# Patient Record
Sex: FEMALE | Race: OTHER | HISPANIC OR LATINO | URBAN - METROPOLITAN AREA
[De-identification: names, ages, dates, MRNs, and addresses within clinical notes are randomized per-mention and may not be internally consistent; named-entity substitution may affect disease eponyms.]

---

## 2018-12-21 ENCOUNTER — EMERGENCY (EMERGENCY)
Facility: HOSPITAL | Age: 18
LOS: 1 days | Discharge: HOME | End: 2018-12-21
Attending: STUDENT IN AN ORGANIZED HEALTH CARE EDUCATION/TRAINING PROGRAM
Payer: COMMERCIAL

## 2018-12-21 VITALS
SYSTOLIC BLOOD PRESSURE: 97 MMHG | WEIGHT: 145.06 LBS | HEIGHT: 67 IN | OXYGEN SATURATION: 98 % | RESPIRATION RATE: 18 BRPM | DIASTOLIC BLOOD PRESSURE: 61 MMHG | TEMPERATURE: 99 F | HEART RATE: 70 BPM

## 2018-12-21 PROCEDURE — 73130 X-RAY EXAM OF HAND: CPT

## 2018-12-21 PROCEDURE — 73110 X-RAY EXAM OF WRIST: CPT

## 2018-12-21 PROCEDURE — 99053 MED SERV 10PM-8AM 24 HR FAC: CPT

## 2018-12-21 PROCEDURE — 73130 X-RAY EXAM OF HAND: CPT | Mod: 26,RT

## 2018-12-21 PROCEDURE — 99283 EMERGENCY DEPT VISIT LOW MDM: CPT | Mod: 25

## 2018-12-21 PROCEDURE — 99284 EMERGENCY DEPT VISIT MOD MDM: CPT | Mod: 25

## 2018-12-21 PROCEDURE — 73110 X-RAY EXAM OF WRIST: CPT | Mod: 26,RT

## 2018-12-21 PROCEDURE — 29125 APPL SHORT ARM SPLINT STATIC: CPT | Mod: RT

## 2018-12-21 PROCEDURE — 29125 APPL SHORT ARM SPLINT STATIC: CPT

## 2018-12-21 RX ORDER — IBUPROFEN 200 MG
600 TABLET ORAL ONCE
Qty: 0 | Refills: 0 | Status: COMPLETED | OUTPATIENT
Start: 2018-12-21 | End: 2018-12-21

## 2018-12-21 RX ADMIN — Medication 600 MILLIGRAM(S): at 02:03

## 2018-12-21 NOTE — ED PROCEDURE NOTE - CPROC ED POST PROC CARE GUIDE1
Keep the cast/splint/dressing clean and dry./Elevate the injured extremity as instructed./Instructed patient/caregiver regarding signs and symptoms of infection./Instructed patient/caregiver to follow-up with primary care physician./Verbal/written post procedure instructions were given to patient/caregiver.

## 2018-12-21 NOTE — ED PROVIDER NOTE - OBJECTIVE STATEMENT
17 y/o F presents to ED c/o R hand pain onset tonight. States she was at work, when 19 y/o F presents to ED c/o R hand pain and swelling onset tonight. 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 She was able to immediately able to pull her hand out but the pain has been worsening since the injury. Denies fevers or chills, numbness or tingling in the fingers, abrasions, lacerations, ecchymosis or pain at the wrist, forearm or elbow. Did not take medications PTA for pain. No further acute complaints at this time.

## 2018-12-21 NOTE — ED PROVIDER NOTE - MEDICAL DECISION MAKING DETAILS
19 y/o F with crush injury to R hand, will obtain x-ray of hand and wrist, splint and followup with ortho

## 2018-12-21 NOTE — ED ADULT TRIAGE NOTE - CHIEF COMPLAINT QUOTE
"I got my hand caught in a mixer at work and it got crushed" c/o right hand pain s/p injury at work. +PMS, cap refill <2sec. Swelling noted to Right wrist, skin intact. Ice applied by EMS PTA. Pt able to ambulate with steady gait noted, appears in no apparent distress @ this time

## 2018-12-21 NOTE — ED PROVIDER NOTE - MUSCULOSKELETAL, MLM
Spine appears normal, TTP and edema at R hypothenar and thenar eminence, R snuff box tenderness and tenderness at 1st/2nd/3rd metacarpal, but able to make fist, opposition, AB and AD duction of fingers